# Patient Record
Sex: MALE | Race: WHITE | Employment: OTHER | URBAN - METROPOLITAN AREA
[De-identification: names, ages, dates, MRNs, and addresses within clinical notes are randomized per-mention and may not be internally consistent; named-entity substitution may affect disease eponyms.]

---

## 2017-02-01 PROBLEM — E78.00 PURE HYPERCHOLESTEROLEMIA: Status: ACTIVE | Noted: 2017-02-01

## 2017-02-01 PROCEDURE — 86803 HEPATITIS C AB TEST: CPT | Performed by: FAMILY MEDICINE

## 2017-02-01 PROCEDURE — 84153 ASSAY OF PSA TOTAL: CPT | Performed by: FAMILY MEDICINE

## 2017-02-01 PROCEDURE — 87389 HIV-1 AG W/HIV-1&-2 AB AG IA: CPT | Performed by: FAMILY MEDICINE

## 2017-05-10 PROBLEM — N52.1 ERECTILE DYSFUNCTION ASSOCIATED WITH TYPE 2 DIABETES MELLITUS (HCC): Status: ACTIVE | Noted: 2017-05-10

## 2017-05-10 PROBLEM — Z00.00 ROUTINE ADULT HEALTH MAINTENANCE: Status: ACTIVE | Noted: 2017-05-10

## 2017-05-10 PROBLEM — I48.91 ATRIAL FIBRILLATION, UNSPECIFIED TYPE (HCC): Status: ACTIVE | Noted: 2017-05-10

## 2017-05-10 PROBLEM — E11.69 ERECTILE DYSFUNCTION ASSOCIATED WITH TYPE 2 DIABETES MELLITUS (HCC): Status: ACTIVE | Noted: 2017-05-10

## 2017-05-10 PROCEDURE — 36415 COLL VENOUS BLD VENIPUNCTURE: CPT | Performed by: FAMILY MEDICINE

## 2017-05-10 PROCEDURE — 82043 UR ALBUMIN QUANTITATIVE: CPT | Performed by: FAMILY MEDICINE

## 2017-05-10 PROCEDURE — 82570 ASSAY OF URINE CREATININE: CPT | Performed by: FAMILY MEDICINE

## 2017-06-13 PROBLEM — I77.810 AORTIC ROOT DILATATION (HCC): Status: ACTIVE | Noted: 2017-06-13

## 2017-06-13 PROBLEM — Q24.8 CARDIAC VALVULAR MALFORMATION: Status: ACTIVE | Noted: 2017-06-13

## 2017-06-19 PROBLEM — E11.21 TYPE 2 DIABETES MELLITUS WITH DIABETIC NEPHROPATHY, WITHOUT LONG-TERM CURRENT USE OF INSULIN (HCC): Status: ACTIVE | Noted: 2017-06-19

## 2017-06-21 PROBLEM — E11.9 DIABETES MELLITUS TYPE 2 WITHOUT RETINOPATHY (HCC): Status: ACTIVE | Noted: 2017-06-21

## 2017-06-21 PROBLEM — H47.323 OPTIC NERVE DRUSEN, BILATERAL: Status: ACTIVE | Noted: 2017-06-21

## 2017-06-21 PROBLEM — Z96.1 PSEUDOPHAKIA: Status: ACTIVE | Noted: 2017-06-21

## 2018-03-06 PROCEDURE — 81001 URINALYSIS AUTO W/SCOPE: CPT | Performed by: FAMILY MEDICINE

## 2018-03-07 PROCEDURE — 84153 ASSAY OF PSA TOTAL: CPT | Performed by: FAMILY MEDICINE

## 2018-03-22 PROBLEM — E78.00 PURE HYPERCHOLESTEROLEMIA: Status: RESOLVED | Noted: 2017-02-01 | Resolved: 2018-03-22

## 2018-03-22 PROBLEM — J84.112 IPF (IDIOPATHIC PULMONARY FIBROSIS) (HCC): Status: ACTIVE | Noted: 2018-03-22

## 2018-06-06 PROBLEM — E11.69 HYPERLIPIDEMIA ASSOCIATED WITH TYPE 2 DIABETES MELLITUS (HCC): Status: ACTIVE | Noted: 2018-06-06

## 2018-06-06 PROBLEM — E78.5 HYPERLIPIDEMIA ASSOCIATED WITH TYPE 2 DIABETES MELLITUS (HCC): Status: ACTIVE | Noted: 2018-06-06

## 2018-06-06 PROBLEM — J98.4 CHRONIC LUNG DISEASE: Status: ACTIVE | Noted: 2018-06-06

## 2019-11-06 ENCOUNTER — HOSPITAL ENCOUNTER (INPATIENT)
Facility: HOSPITAL | Age: 79
LOS: 2 days | Discharge: HOME | DRG: 193 | End: 2019-11-08
Attending: EMERGENCY MEDICINE | Admitting: INTERNAL MEDICINE
Payer: MEDICARE

## 2019-11-06 ENCOUNTER — APPOINTMENT (OUTPATIENT)
Dept: CT IMAGING | Facility: HOSPITAL | Age: 79
DRG: 193 | End: 2019-11-06
Attending: EMERGENCY MEDICINE
Payer: MEDICARE

## 2019-11-06 ENCOUNTER — APPOINTMENT (OUTPATIENT)
Dept: GENERAL RADIOLOGY | Facility: HOSPITAL | Age: 79
DRG: 193 | End: 2019-11-06
Attending: EMERGENCY MEDICINE
Payer: MEDICARE

## 2019-11-06 DIAGNOSIS — R09.02 HYPOXIA: Primary | ICD-10-CM

## 2019-11-06 PROCEDURE — 93010 ELECTROCARDIOGRAM REPORT: CPT

## 2019-11-06 PROCEDURE — 87449 NOS EACH ORGANISM AG IA: CPT | Performed by: INTERNAL MEDICINE

## 2019-11-06 PROCEDURE — 86140 C-REACTIVE PROTEIN: CPT | Performed by: INTERNAL MEDICINE

## 2019-11-06 PROCEDURE — 93005 ELECTROCARDIOGRAM TRACING: CPT

## 2019-11-06 PROCEDURE — 99285 EMERGENCY DEPT VISIT HI MDM: CPT

## 2019-11-06 PROCEDURE — 83876 ASSAY MYELOPEROXIDASE: CPT | Performed by: INTERNAL MEDICINE

## 2019-11-06 PROCEDURE — 85730 THROMBOPLASTIN TIME PARTIAL: CPT | Performed by: EMERGENCY MEDICINE

## 2019-11-06 PROCEDURE — 87040 BLOOD CULTURE FOR BACTERIA: CPT | Performed by: EMERGENCY MEDICINE

## 2019-11-06 PROCEDURE — 82962 GLUCOSE BLOOD TEST: CPT

## 2019-11-06 PROCEDURE — 83516 IMMUNOASSAY NONANTIBODY: CPT | Performed by: INTERNAL MEDICINE

## 2019-11-06 PROCEDURE — 83880 ASSAY OF NATRIURETIC PEPTIDE: CPT | Performed by: EMERGENCY MEDICINE

## 2019-11-06 PROCEDURE — 85652 RBC SED RATE AUTOMATED: CPT | Performed by: INTERNAL MEDICINE

## 2019-11-06 PROCEDURE — 71045 X-RAY EXAM CHEST 1 VIEW: CPT | Performed by: EMERGENCY MEDICINE

## 2019-11-06 PROCEDURE — 86255 FLUORESCENT ANTIBODY SCREEN: CPT | Performed by: INTERNAL MEDICINE

## 2019-11-06 PROCEDURE — 96375 TX/PRO/DX INJ NEW DRUG ADDON: CPT

## 2019-11-06 PROCEDURE — 96365 THER/PROPH/DIAG IV INF INIT: CPT

## 2019-11-06 PROCEDURE — 85610 PROTHROMBIN TIME: CPT | Performed by: EMERGENCY MEDICINE

## 2019-11-06 PROCEDURE — 80053 COMPREHEN METABOLIC PANEL: CPT | Performed by: EMERGENCY MEDICINE

## 2019-11-06 PROCEDURE — 71275 CT ANGIOGRAPHY CHEST: CPT | Performed by: EMERGENCY MEDICINE

## 2019-11-06 PROCEDURE — 85025 COMPLETE CBC W/AUTO DIFF WBC: CPT | Performed by: EMERGENCY MEDICINE

## 2019-11-06 PROCEDURE — 94640 AIRWAY INHALATION TREATMENT: CPT

## 2019-11-06 PROCEDURE — 84484 ASSAY OF TROPONIN QUANT: CPT | Performed by: EMERGENCY MEDICINE

## 2019-11-06 PROCEDURE — 36415 COLL VENOUS BLD VENIPUNCTURE: CPT

## 2019-11-06 PROCEDURE — 86038 ANTINUCLEAR ANTIBODIES: CPT | Performed by: INTERNAL MEDICINE

## 2019-11-06 PROCEDURE — 96372 THER/PROPH/DIAG INJ SC/IM: CPT

## 2019-11-06 RX ORDER — ATORVASTATIN CALCIUM 20 MG/1
20 TABLET, FILM COATED ORAL DAILY
Status: DISCONTINUED | OUTPATIENT
Start: 2019-11-07 | End: 2019-11-08

## 2019-11-06 RX ORDER — ACETAMINOPHEN 325 MG/1
650 TABLET ORAL EVERY 6 HOURS PRN
Status: DISCONTINUED | OUTPATIENT
Start: 2019-11-06 | End: 2019-11-08

## 2019-11-06 RX ORDER — LISINOPRIL 40 MG/1
40 TABLET ORAL DAILY
COMMUNITY
End: 2020-01-01

## 2019-11-06 RX ORDER — DEXTROSE MONOHYDRATE 25 G/50ML
50 INJECTION, SOLUTION INTRAVENOUS
Status: DISCONTINUED | OUTPATIENT
Start: 2019-11-06 | End: 2019-11-08

## 2019-11-06 RX ORDER — IPRATROPIUM BROMIDE AND ALBUTEROL SULFATE 2.5; .5 MG/3ML; MG/3ML
3 SOLUTION RESPIRATORY (INHALATION) ONCE
Status: COMPLETED | OUTPATIENT
Start: 2019-11-06 | End: 2019-11-06

## 2019-11-06 RX ORDER — ATORVASTATIN CALCIUM 20 MG/1
20 TABLET, FILM COATED ORAL NIGHTLY
Status: DISCONTINUED | OUTPATIENT
Start: 2019-11-06 | End: 2019-11-06

## 2019-11-06 RX ORDER — HYDRALAZINE HYDROCHLORIDE 20 MG/ML
10 INJECTION INTRAMUSCULAR; INTRAVENOUS EVERY 6 HOURS PRN
Status: DISCONTINUED | OUTPATIENT
Start: 2019-11-06 | End: 2019-11-08

## 2019-11-06 RX ORDER — SIMVASTATIN 40 MG
40 TABLET ORAL NIGHTLY
COMMUNITY
End: 2020-01-01

## 2019-11-06 RX ORDER — LISINOPRIL 40 MG/1
40 TABLET ORAL DAILY
Status: DISCONTINUED | OUTPATIENT
Start: 2019-11-07 | End: 2019-11-08

## 2019-11-06 RX ORDER — METHYLPREDNISOLONE SODIUM SUCCINATE 125 MG/2ML
125 INJECTION, POWDER, LYOPHILIZED, FOR SOLUTION INTRAMUSCULAR; INTRAVENOUS ONCE
Status: COMPLETED | OUTPATIENT
Start: 2019-11-06 | End: 2019-11-06

## 2019-11-06 RX ORDER — IPRATROPIUM BROMIDE AND ALBUTEROL SULFATE 2.5; .5 MG/3ML; MG/3ML
3 SOLUTION RESPIRATORY (INHALATION) EVERY 4 HOURS PRN
Status: DISCONTINUED | OUTPATIENT
Start: 2019-11-06 | End: 2019-11-08

## 2019-11-06 NOTE — ED NOTES
Patient assisted with urinal at bedside. Patient sitting on side of bed and then assisted back into position of comfort. O2 sat observed to be 93% on 3L with activity. Quickly increasing back to 96% once back in bed.

## 2019-11-06 NOTE — ED INITIAL ASSESSMENT (HPI)
Patient reports worsening sob, progressing over the last year. Reports hoarse voice for past 2 days. Admits to dry cough. Reports symptoms seem to be progressing since weather has gotten colder. Patient reports possible pulmonary fibrosis diagnosis.  Arrive

## 2019-11-06 NOTE — PLAN OF CARE
Pt denies c/o pain. A/Ox4. Breath sounds diminished and crackles in bases. 3L O2 per nasal canula. Afib. HR controlled under 100 bpm. Palpable pulses +2. BP 150s/100s. MD paged and made aware. PRN Hydralazine order per eMAR.   Bowel sounds active in four q Angina  - Evaluate fluid balance, assess for edema, trend weights  Outcome: Progressing  Goal: Absence of cardiac arrhythmias or at baseline  Description  INTERVENTIONS:  - Continuous cardiac monitoring, monitor vital signs, obtain 12 lead EKG if indicated

## 2019-11-06 NOTE — ED NOTES
Patient remains updated with plan of care. Has returned from CT. Reports feeling comfortable at rest. No distress observed. Remains on 3L nc O2. O2 sat stable at 98%.

## 2019-11-06 NOTE — H&P
[facility] Hospitalist H&P       CC: Patient presents with:  Dyspnea KARLEY SOB (respiratory)       PCP: Collette Quitter, MD    History of Present Illness:  Pt is a 78year old male with PMHx a fib, HTN, HLD, gout, pulm fibrosis, pulm HTN who presented to the Soc Hx  Social History    Tobacco Use      Smoking status: Former Smoker        Packs/day: 1.50      Smokeless tobacco: Never Used      Tobacco comment: approx 40 pack yrs, quit 1980    Alcohol use:  Yes      Alcohol/week: 0.0 standard drinks 9.0       Recent Labs   Lab 11/06/19  0929   ALT 12*   AST 26   ALB 3.3*       Recent Labs   Lab 11/06/19  0929   TROP <0.045       Additional Diagnostics:     ECG: personally reviewed  Atrial fibrillation  Right bundle branch block  Left anterior fascicul opacities with thickening of the interlobular septa either representing changes related to fibrosis or underlying pneumonia. The thickening of the fissures may represent some underlying CHF/interstitial edema.  MEDIASTINUM:  There is mild mediastinal lymph shortness of breath. FINDINGS:  Cardiomegaly with pulmonary vasculature redistribution and diffuse increased interstitial opacities. Small patches of scattered airspace opacity bilaterally.   Costophrenic angles not well delineated and not included on t midnight's based on the clinical documentation in H+P. Based on patients current state of illness, I anticipate that, after discharge, patient will require TBD.

## 2019-11-06 NOTE — ED NOTES
Patient remains updated with results and plan for admission at this time. Waiting for respiratory to administer neb treatment. No acute distress observed. Feeling better at rest. Remains on 3L nc O2.

## 2019-11-06 NOTE — ED PROVIDER NOTES
Patient Seen in: [facility] Emergency Department      History   Patient presents with:  Dyspnea KARLEY SOB (respiratory)    Stated Complaint: SOB    HPI    Radha Cueva is a pleasant 44-year-old male coming with complaints of shortness of breath.   He has bee 98 %   O2 Device Nasal cannula       Current:/82   Pulse 68   Temp 98.1 °F (36.7 °C) (Temporal)   Resp 18   Ht 193 cm (6' 4\")   Wt 102.1 kg   SpO2 96%   BMI 27.39 kg/m²         Physical Exam  Alert and oriented patient appears no distress.   HEENT ex intervals and axes as noted on EKG Report.   Rate: 79  Rhythm: Atrial Fibrillation  Reading: Bifascicular block noted                      MDM     Patient was given breathing treatment here in the emerge department antibiotics steroids patient will be admit

## 2019-11-07 PROCEDURE — 85027 COMPLETE CBC AUTOMATED: CPT | Performed by: INTERNAL MEDICINE

## 2019-11-07 PROCEDURE — 87205 SMEAR GRAM STAIN: CPT | Performed by: INTERNAL MEDICINE

## 2019-11-07 PROCEDURE — 87070 CULTURE OTHR SPECIMN AEROBIC: CPT | Performed by: INTERNAL MEDICINE

## 2019-11-07 PROCEDURE — 82962 GLUCOSE BLOOD TEST: CPT

## 2019-11-07 PROCEDURE — 80048 BASIC METABOLIC PNL TOTAL CA: CPT | Performed by: INTERNAL MEDICINE

## 2019-11-07 NOTE — PROGRESS NOTES
11/07/19 5660   Clinical Encounter Type   Routine Visit   (consult to receive communion)   Referral From Nurse   Referral To   ([facility]  for [employer] as per consult received.)   Buddhism Encounters   Spiritual Requests During Visi

## 2019-11-07 NOTE — PLAN OF CARE
Patient here with increasing shortness of breath. Lung sounds diminished with crackles at bases. Daily IV antibiotics. 3L O2 with sats of 95-97%. Accuchecks QID. Need sputum culture. Plan of care updated with patient.       Problem: Diabetes/Glucose Con oxygenation and minimize respiratory effort  - Oxygen supplementation based on oxygen saturation or ABGs  - Provide Smoking Cessation handout, if applicable  - Encourage broncho-pulmonary hygiene including cough, deep breathe, Incentive Spirometry  - Asses

## 2019-11-07 NOTE — PLAN OF CARE
Patient ambulated in hallway SBA only at 100 ft pt desats to 82% 0n RA  Recovers quickly with sitting 94%  Repeated walk 85 % RA after 100 ft with oxygen at 3L/NC able to walk 500 ft oxygen saturation remained 91%. Pt denied any SOB.    At rest in chair ab

## 2019-11-07 NOTE — PROGRESS NOTES
[facility] Hospitalist Team  Progress Note      Airam Spencer  1/70/1863    Assessment/Plan:     78year old male with PMHx a fib, HTN, HLD, gout, pulm fibrosis, pulm HTN who presented to the ED with SOB and cough.      # AHRF  # Dyspnea, Cough  - pulm consulte ALT 12*   AST 26   ALB 3.3*       Recent Labs   Lab 11/06/19  1551 11/06/19  2104 11/07/19  0713 11/07/19  1136   PGLU 271* 237* 213* 190*       Meds:   Scheduled:   • cefTRIAXone  2 g Intravenous Q24H   • lisinopril  40 mg Oral Daily   • Rivaroxaban  20

## 2019-11-08 VITALS
HEIGHT: 76 IN | SYSTOLIC BLOOD PRESSURE: 125 MMHG | TEMPERATURE: 99 F | DIASTOLIC BLOOD PRESSURE: 70 MMHG | RESPIRATION RATE: 18 BRPM | BODY MASS INDEX: 27.35 KG/M2 | OXYGEN SATURATION: 95 % | WEIGHT: 224.63 LBS | HEART RATE: 71 BPM

## 2019-11-08 PROCEDURE — 82962 GLUCOSE BLOOD TEST: CPT

## 2019-11-08 RX ORDER — CEFUROXIME AXETIL 500 MG/1
500 TABLET ORAL 2 TIMES DAILY
Qty: 10 TABLET | Refills: 0 | Status: SHIPPED | OUTPATIENT
Start: 2019-11-08 | End: 2019-11-13

## 2019-11-08 NOTE — PLAN OF CARE
O2 Walk    SPO2% ON ROOM AIR AT REST 94    SPOT% AMBULATION ON ROOM AIR 82    SPO2% AMBULATION ON O2 92% ON 3 LITERS PER MINUTE.

## 2019-11-08 NOTE — PROGRESS NOTES
Pulmonary Progress Note        NAME: Rex Michael - ROOM: 8113/8639-Q - MRN: [id number] - Age: 78year old - : 1940        Last 24hrs: No events overnight, feels better since admission, notes that he still desaturates w/ ambulation but is off O2 results for input(s): BNP in the last 72 hours.     Invalid input(s): TROPI    INR   Date/Time Value Ref Range Status   11/06/2019 09:29 AM 2.25 (H) 0.90 - 1.10 Final     TSH   Date/Time Value Ref Range Status   06/03/2019 10:31 AM 2.190 0.350 - 5.500 mIU/L

## 2019-11-08 NOTE — PROGRESS NOTES
[facility] Hospitalist Team  Progress Note      Nicolejordon Dilma  [birth year]    Assessment/Plan:     78year old male with PMHx a fib, HTN, HLD, gout, pulm fibrosis, pulm HTN who presented to the ED with SOB and cough.      # AHRF  # Dyspnea, Cough  - pulm consulte CO2 22.0 24.0   BUN 11 16   CREATSERUM 0.89 0.82   CA 9.0 9.1   * 218*       Recent Labs   Lab 11/06/19  0929   ALT 12*   AST 26   ALB 3.3*       Recent Labs   Lab 11/07/19  0713 11/07/19  1136 11/07/19  1617 11/07/19  2026 11/08/19  0717   PGLU 2

## 2019-11-08 NOTE — PROGRESS NOTES
11/08/19 1328   Clinical Encounter Type   Visited With Patient   Sacramental Encounters   Sacrament of Sick-Anointing Anointed   The patient was seen by Lang Bosworth. Received prayer, Scripture, support and Sacrament of the Sick.

## 2019-11-08 NOTE — DISCHARGE SUMMARY
General Medicine Discharge Summary     Patient ID:  Lore Boo  78year old  [birth year]    Admit date: 11/6/2019    Discharge date and time: 11/8/19    Attending Physician: Chris Lal DO Procedure(s) (LRB):  BRONCHOSCOPY (N/A)       Patient instructions:      Current Discharge Medication List    START taking these medications    Cefuroxime Axetil 500 MG Oral Tab  Take 1 tablet (500 mg total) by mouth 2 (two) times daily for 5 days.       CO

## 2019-11-08 NOTE — CM/SW NOTE
RN called re: pt needing Home O2. Order sent to [facility]. RT to bring portable to patient's room. Await confirmation from [facility] that all is good with order and will deliver concentrator once pt returns home.     Referral sent to [facility] for Home RN

## 2019-11-08 NOTE — PLAN OF CARE
Patient is alert and oriented x4. Patient asking about plan of care. Patient updated on plan of care. Denies questions or concerns at this time. Patient verbalized understanding of education. Plan is possible bronchoscopy Monday.  Frequent rounding in place Progressing  Goal: Absence of cardiac arrhythmias or at baseline  Description  INTERVENTIONS:  - Continuous cardiac monitoring, monitor vital signs, obtain 12 lead EKG if indicated  - Evaluate effectiveness of antiarrhythmic and heart rate control medicati

## 2019-11-13 ENCOUNTER — ANESTHESIA (OUTPATIENT)
Dept: ENDOSCOPY | Facility: HOSPITAL | Age: 79
End: 2019-11-13
Payer: MEDICARE

## 2019-11-13 ENCOUNTER — APPOINTMENT (OUTPATIENT)
Dept: GENERAL RADIOLOGY | Facility: HOSPITAL | Age: 79
End: 2019-11-13
Attending: INTERNAL MEDICINE
Payer: MEDICARE

## 2019-11-13 ENCOUNTER — HOSPITAL ENCOUNTER (OUTPATIENT)
Facility: HOSPITAL | Age: 79
Setting detail: HOSPITAL OUTPATIENT SURGERY
Discharge: HOME | End: 2019-11-13
Attending: INTERNAL MEDICINE | Admitting: INTERNAL MEDICINE
Payer: MEDICARE

## 2019-11-13 ENCOUNTER — ANESTHESIA EVENT (OUTPATIENT)
Dept: ENDOSCOPY | Facility: HOSPITAL | Age: 79
End: 2019-11-13
Payer: MEDICARE

## 2019-11-13 VITALS
DIASTOLIC BLOOD PRESSURE: 71 MMHG | HEART RATE: 62 BPM | TEMPERATURE: 98 F | BODY MASS INDEX: 27.4 KG/M2 | RESPIRATION RATE: 18 BRPM | WEIGHT: 225 LBS | OXYGEN SATURATION: 95 % | SYSTOLIC BLOOD PRESSURE: 134 MMHG | HEIGHT: 76 IN

## 2019-11-13 PROCEDURE — 71045 X-RAY EXAM CHEST 1 VIEW: CPT | Performed by: INTERNAL MEDICINE

## 2019-11-13 PROCEDURE — 87176 TISSUE HOMOGENIZATION CULTR: CPT | Performed by: INTERNAL MEDICINE

## 2019-11-13 PROCEDURE — 87075 CULTR BACTERIA EXCEPT BLOOD: CPT | Performed by: INTERNAL MEDICINE

## 2019-11-13 PROCEDURE — 87206 SMEAR FLUORESCENT/ACID STAI: CPT | Performed by: INTERNAL MEDICINE

## 2019-11-13 PROCEDURE — 87116 MYCOBACTERIA CULTURE: CPT | Performed by: INTERNAL MEDICINE

## 2019-11-13 PROCEDURE — 87070 CULTURE OTHR SPECIMN AEROBIC: CPT | Performed by: INTERNAL MEDICINE

## 2019-11-13 PROCEDURE — 88104 CYTOPATH FL NONGYN SMEARS: CPT | Performed by: INTERNAL MEDICINE

## 2019-11-13 PROCEDURE — 89050 BODY FLUID CELL COUNT: CPT | Performed by: INTERNAL MEDICINE

## 2019-11-13 PROCEDURE — 87205 SMEAR GRAM STAIN: CPT | Performed by: INTERNAL MEDICINE

## 2019-11-13 PROCEDURE — 89051 BODY FLUID CELL COUNT: CPT | Performed by: INTERNAL MEDICINE

## 2019-11-13 PROCEDURE — 0B9H8ZX DRAINAGE OF LUNG LINGULA, VIA NATURAL OR ARTIFICIAL OPENING ENDOSCOPIC, DIAGNOSTIC: ICD-10-PCS | Performed by: INTERNAL MEDICINE

## 2019-11-13 PROCEDURE — 87071 CULTURE AEROBIC QUANT OTHER: CPT | Performed by: INTERNAL MEDICINE

## 2019-11-13 PROCEDURE — 88312 SPECIAL STAINS GROUP 1: CPT | Performed by: INTERNAL MEDICINE

## 2019-11-13 PROCEDURE — 87102 FUNGUS ISOLATION CULTURE: CPT | Performed by: INTERNAL MEDICINE

## 2019-11-13 PROCEDURE — 87077 CULTURE AEROBIC IDENTIFY: CPT | Performed by: INTERNAL MEDICINE

## 2019-11-13 PROCEDURE — 88305 TISSUE EXAM BY PATHOLOGIST: CPT | Performed by: INTERNAL MEDICINE

## 2019-11-13 PROCEDURE — 82962 GLUCOSE BLOOD TEST: CPT

## 2019-11-13 PROCEDURE — 0B9F8ZX DRAINAGE OF RIGHT LOWER LUNG LOBE, VIA NATURAL OR ARTIFICIAL OPENING ENDOSCOPIC, DIAGNOSTIC: ICD-10-PCS | Performed by: INTERNAL MEDICINE

## 2019-11-13 RX ORDER — SODIUM CHLORIDE, SODIUM LACTATE, POTASSIUM CHLORIDE, CALCIUM CHLORIDE 600; 310; 30; 20 MG/100ML; MG/100ML; MG/100ML; MG/100ML
INJECTION, SOLUTION INTRAVENOUS CONTINUOUS
Status: DISCONTINUED | OUTPATIENT
Start: 2019-11-13 | End: 2019-11-13

## 2019-11-13 RX ORDER — MIDAZOLAM HYDROCHLORIDE 1 MG/ML
INJECTION INTRAMUSCULAR; INTRAVENOUS AS NEEDED
Status: DISCONTINUED | OUTPATIENT
Start: 2019-11-13 | End: 2019-11-13 | Stop reason: SURG

## 2019-11-13 RX ORDER — DEXTROSE MONOHYDRATE 25 G/50ML
50 INJECTION, SOLUTION INTRAVENOUS
Status: DISCONTINUED | OUTPATIENT
Start: 2019-11-13 | End: 2019-11-13

## 2019-11-13 RX ORDER — NALOXONE HYDROCHLORIDE 0.4 MG/ML
80 INJECTION, SOLUTION INTRAMUSCULAR; INTRAVENOUS; SUBCUTANEOUS AS NEEDED
Status: DISCONTINUED | OUTPATIENT
Start: 2019-11-13 | End: 2019-11-13

## 2019-11-13 RX ORDER — LIDOCAINE HYDROCHLORIDE 20 MG/ML
4 INJECTION, SOLUTION INFILTRATION; PERINEURAL ONCE
Status: COMPLETED | OUTPATIENT
Start: 2019-11-13 | End: 2019-11-13

## 2019-11-13 RX ORDER — ONDANSETRON 2 MG/ML
4 INJECTION INTRAMUSCULAR; INTRAVENOUS ONCE AS NEEDED
Status: DISCONTINUED | OUTPATIENT
Start: 2019-11-13 | End: 2019-11-13

## 2019-11-13 RX ADMIN — MIDAZOLAM HYDROCHLORIDE 2 MG: 1 INJECTION INTRAMUSCULAR; INTRAVENOUS at 12:13:00

## 2019-11-13 NOTE — OPERATIVE REPORT
Bronchoscopy procedure report    Preop diagnosis: abnl CT chest  Postop diagnosis:  abnl CT chest  Procedure performed: Bronchoscopy, Diagnostic  Bronchoalveolar lavage, BAL  Transbronchial biopsy    Sedation used: MAC- please see their documentation, topi

## 2019-11-13 NOTE — ANESTHESIA POSTPROCEDURE EVALUATION
[address] Patient Status:  Hospital Outpatient Surgery   Age/Gender 78year old male MRN [id number]   Location [facility]. Attending Sanam Watkins MD   Hosp Day # 0 PCP Maximino Martin MD       Anesthesia

## 2019-11-13 NOTE — ANESTHESIA POSTPROCEDURE EVALUATION
[address] Patient Status:  Hospital Outpatient Surgery   Age/Gender 78year old male MRN [id number]   Location 118 [facility]. Attending Hilario Aguilar MD   Hosp Day # 0 PCP Veronika Campa MD       Anesthesia

## 2019-11-13 NOTE — ANESTHESIA PREPROCEDURE EVALUATION
PRE-OP EVALUATION    Patient Name: Tod Abler    Pre-op Diagnosis: PULMONARY FIBROSIS    Procedure(s):  BRONCHOSCOPY WITH BRONCHOALVEOLAR LAVAGE AND TRANSBRONCHIAL BIOPSY    Surgeon(s) and Role:     * Romie Uriarte MD - Primary    Pre-op vital Complications           GI/Hepatic/Renal                                 Cardiovascular                  (+) hypertension   (+) hyperlipidemia                                  Endo/Other      (+) diabetes                            Pulmonary

## 2019-11-13 NOTE — H&P
Consult note from 11/6 and f/u note from 11/8 reviewed. No changes in symptoms since that time  Pt continues to feel better with the O2, denies issues w/ it. No new symptoms to report.     /77 (BP Location: Left arm)   Pulse 76   Temp 98.1 °F (36.7

## 2020-01-01 ENCOUNTER — APPOINTMENT (OUTPATIENT)
Dept: CT IMAGING | Facility: HOSPITAL | Age: 80
End: 2020-01-01
Attending: EMERGENCY MEDICINE
Payer: MEDICARE

## 2020-01-01 ENCOUNTER — APPOINTMENT (OUTPATIENT)
Dept: GENERAL RADIOLOGY | Facility: HOSPITAL | Age: 80
End: 2020-01-01
Attending: EMERGENCY MEDICINE
Payer: MEDICARE

## 2020-01-01 ENCOUNTER — HOSPITAL ENCOUNTER (OUTPATIENT)
Facility: HOSPITAL | Age: 80
Setting detail: OBSERVATION
Discharge: HOME | End: 2020-01-01
Attending: EMERGENCY MEDICINE | Admitting: INTERNAL MEDICINE
Payer: MEDICARE

## 2020-01-01 VITALS
TEMPERATURE: 98 F | HEIGHT: 77 IN | WEIGHT: 212 LBS | SYSTOLIC BLOOD PRESSURE: 115 MMHG | DIASTOLIC BLOOD PRESSURE: 60 MMHG | RESPIRATION RATE: 18 BRPM | OXYGEN SATURATION: 95 % | BODY MASS INDEX: 25.03 KG/M2 | HEART RATE: 60 BPM

## 2020-01-01 DIAGNOSIS — J84.10 FIBROTIC LUNG DISEASES (HCC): ICD-10-CM

## 2020-01-01 DIAGNOSIS — R06.00 DYSPNEA, UNSPECIFIED TYPE: Primary | ICD-10-CM

## 2020-01-01 PROCEDURE — 93010 ELECTROCARDIOGRAM REPORT: CPT

## 2020-01-01 PROCEDURE — 97162 PT EVAL MOD COMPLEX 30 MIN: CPT

## 2020-01-01 PROCEDURE — 36415 COLL VENOUS BLD VENIPUNCTURE: CPT

## 2020-01-01 PROCEDURE — 83880 ASSAY OF NATRIURETIC PEPTIDE: CPT | Performed by: INTERNAL MEDICINE

## 2020-01-01 PROCEDURE — 85025 COMPLETE CBC W/AUTO DIFF WBC: CPT

## 2020-01-01 PROCEDURE — 71045 X-RAY EXAM CHEST 1 VIEW: CPT | Performed by: EMERGENCY MEDICINE

## 2020-01-01 PROCEDURE — 93005 ELECTROCARDIOGRAM TRACING: CPT

## 2020-01-01 PROCEDURE — 80048 BASIC METABOLIC PNL TOTAL CA: CPT | Performed by: INTERNAL MEDICINE

## 2020-01-01 PROCEDURE — 80053 COMPREHEN METABOLIC PANEL: CPT | Performed by: EMERGENCY MEDICINE

## 2020-01-01 PROCEDURE — 97165 OT EVAL LOW COMPLEX 30 MIN: CPT

## 2020-01-01 PROCEDURE — 96374 THER/PROPH/DIAG INJ IV PUSH: CPT

## 2020-01-01 PROCEDURE — 82962 GLUCOSE BLOOD TEST: CPT

## 2020-01-01 PROCEDURE — 70450 CT HEAD/BRAIN W/O DYE: CPT | Performed by: EMERGENCY MEDICINE

## 2020-01-01 PROCEDURE — 99285 EMERGENCY DEPT VISIT HI MDM: CPT

## 2020-01-01 PROCEDURE — 85025 COMPLETE CBC W/AUTO DIFF WBC: CPT | Performed by: EMERGENCY MEDICINE

## 2020-01-01 PROCEDURE — 85025 COMPLETE CBC W/AUTO DIFF WBC: CPT | Performed by: INTERNAL MEDICINE

## 2020-01-01 PROCEDURE — 84484 ASSAY OF TROPONIN QUANT: CPT | Performed by: EMERGENCY MEDICINE

## 2020-01-01 PROCEDURE — 83735 ASSAY OF MAGNESIUM: CPT | Performed by: INTERNAL MEDICINE

## 2020-01-01 PROCEDURE — 97535 SELF CARE MNGMENT TRAINING: CPT

## 2020-01-01 PROCEDURE — 84145 PROCALCITONIN (PCT): CPT | Performed by: INTERNAL MEDICINE

## 2020-01-01 PROCEDURE — 97116 GAIT TRAINING THERAPY: CPT

## 2020-01-01 RX ORDER — COLCHICINE 0.6 MG/1
0.6 TABLET ORAL
Status: DISCONTINUED | OUTPATIENT
Start: 2020-01-01 | End: 2020-01-01

## 2020-01-01 RX ORDER — LISINOPRIL 40 MG/1
40 TABLET ORAL DAILY
Status: DISCONTINUED | OUTPATIENT
Start: 2020-01-01 | End: 2020-01-01

## 2020-01-01 RX ORDER — ACETAMINOPHEN 325 MG/1
650 TABLET ORAL EVERY 6 HOURS PRN
Status: DISCONTINUED | OUTPATIENT
Start: 2020-01-01 | End: 2020-01-01

## 2020-01-01 RX ORDER — FUROSEMIDE 10 MG/ML
20 INJECTION INTRAMUSCULAR; INTRAVENOUS ONCE
Status: COMPLETED | OUTPATIENT
Start: 2020-01-01 | End: 2020-01-01

## 2020-01-01 RX ORDER — ATORVASTATIN CALCIUM 20 MG/1
20 TABLET, FILM COATED ORAL NIGHTLY
Refills: 4 | Status: DISCONTINUED | OUTPATIENT
Start: 2020-01-01 | End: 2020-01-01

## 2020-01-01 RX ORDER — DEXTROSE MONOHYDRATE 25 G/50ML
50 INJECTION, SOLUTION INTRAVENOUS
Status: DISCONTINUED | OUTPATIENT
Start: 2020-01-01 | End: 2020-01-01

## 2020-01-01 RX ORDER — A/SINGAPORE/GP1908/2015 IVR-180 (AN A/MICHIGAN/45/2015 (H1N1)PDM09-LIKE VIRUS, A/HONG KONG/4801/2014, NYMC X-263B (H3N2) (AN A/HONG KONG/4801/2014-LIKE VIRUS), AND B/BRISBANE/60/2008, WILD TYPE (A B/BRISBANE/60/2008-LIKE VIRUS) 15; 15; 15 UG/.5ML; UG/.5ML; UG/.5ML
INJECTION, SUSPENSION INTRAMUSCULAR ONCE
COMMUNITY
Start: 2020-01-01

## 2020-11-09 PROBLEM — R06.00 DYSPNEA: Status: ACTIVE | Noted: 2020-01-01

## 2020-11-09 PROBLEM — J84.10 FIBROTIC LUNG DISEASES (HCC): Status: ACTIVE | Noted: 2020-01-01

## 2020-11-09 PROBLEM — R06.00 DYSPNEA, UNSPECIFIED TYPE: Status: ACTIVE | Noted: 2020-01-01

## 2020-11-09 NOTE — PROGRESS NOTES
Received pt from ER via transport  Pt A&Ox4 calm and cooperative  Vs wnl, 4L O2 via nasal cannula, afib on tele  Per Dr. Rosie Bergeron d/c'd tele  [facility] consulted to see pt tomorrow per MSW  20mg IVP lasix x1 given  accucheck QID with novolog sliding

## 2020-11-09 NOTE — CONSULTS
[facility]  Report of Consultation    Berto Stoutkarla Patient Status:  Observation    1940 MRN [id number]   [facility] 3NE-A Attending Celeste Ortega MD   Hosp Day # 0 PCP Kendrick Torres MD     Reason for Consultation (Other) Brother    • Stroke Father    • Lung Disorder Father         SMOKER   • Gastro-Intestinal Disorder Brother         LACTOSE INTOLERANCE   • Heart Disorder Brother         ARRHYTHMIAS      reports that he quit smoking about 40 years ago.  His smoking carotids  Cardiac: Regular rate and irreg rhythm, S1, S2 normal, no murmur, rub or gallop. Lungs: diffuse dry rales without wheezes  Abdomen: Soft, thin, non-tender, non-distended.   No hepatosplenomegaly, bruits, masses or pulsatile masses  Extremities: W

## 2020-11-09 NOTE — ED PROVIDER NOTES
Patient Seen in: [facility] Emergency Department      History   Patient presents with:  Difficulty Breathing    Stated Complaint:     HPI    40-year-old male with history of pulmonary fibrosis presents reporting significantly worsening shortness of Monthly or less      Drinks per session: 1 or 2      Binge frequency: Never      Comment: RARE, 1-2/YR. Drug use: No      Comment: NEVER             Review of Systems    Positive for stated complaint:   Other systems are as noted in HPI.   Constitutional CBC WITH DIFFERENTIAL WITH PLATELET    Narrative: The following orders were created for panel order CBC WITH DIFFERENTIAL WITH PLATELET.   Procedure                               Abnormality         Status                     --------- Xr Chest Ap Portable  (cpt=71045)    Result Date: 11/9/2020  PROCEDURE:  XR CHEST AP PORTABLE  (CPT=71045)  TECHNIQUE:  AP chest radiograph was obtained.   COMPARISON:  [facility] , CT, CT ANGIOGRAPHY, CHEST (CPT=71275), 11/06/2019, 11:03 AM.  [facility] , XR,

## 2020-11-09 NOTE — HOSPICE RN NOTE
Spoke with patient's laura [place] to discuss Hospice services. [place] states he is going to call his father later tonight and discuss where his father would like to live post hospital discharge- to return to the facility or move in with family.  [place] states he has a

## 2020-11-09 NOTE — ED INITIAL ASSESSMENT (HPI)
Shortness of breath since  Few days getting worse now  With dry cough. Denies chest pain .  Patient is on oxygen 4 liter at home

## 2020-11-09 NOTE — CM/SW NOTE
4:32pm   2nd page to Hospice.  Edgar Kruse from [facility] called back and has referral.    3:59pm  MSW paged [facility] with referral.  Liaison will meet with the patient/familyto provide choice, explanation of services, and financial disclosure.     [facility]

## 2020-11-09 NOTE — CONSULTS
Pulmonary H&P/Consult       NAME: aFviola Lang - ROOM: [address] [place], [place] [id number] - MRN: [id number] - Age: [de-identified]year old - :  1940    Date of Admission: 2020 10:05 AM  Admission Diagnosis: Fib[facility] lung [facility] ([facility] [J84.10]  Dyspnea, unspecified type [R06 SIMVASTATIN 40 MG Oral Tab, TAKE 1 TABLET NIGHTLY, Disp: 90 tablet, Rfl: 4, 11/8/2020 at 2100    •  LISINOPRIL 40 MG Oral Tab, TAKE 1 TABLET NIGHTLY, Disp: 90 tablet, Rfl: 4, 11/9/2020 at 0900    •  FLUAD QUADRIVALENT 0.5 ML Intramuscular Prefilled Syringe service: Not on file        Active member of club or organization: Not on file        Attends meetings of clubs or organizations: Not on file        Relationship status: Not on file      Intimate partner violence        Fear of current or ex partner: Not o GENERAL:  feels well otherwise   SKIN:  denies any unusual skin lesions   EYES:  denies blurred vision or double vision   HEENT:  denies nasal congestion, sinus pain/tenderness  RESPIRATORY: see above  CARDIOVASCULAR:  denies current chest pain   GI:  de all four quadrants,     no masses, no organomegaly   Extremities:   Trace to 1+ edema L > R   Pulses:   2+ and symmetric all extremities   Skin:   Skin color, texture, turgor normal, no rashes or lesions   Neurologic:   CNII-XII intact.  Normal strength, se

## 2020-11-09 NOTE — PROGRESS NOTES
11/09/20 1645   Clinical Encounter Type   Visited With Patient   Routine Visit Introduction  (POLST requested and completed by the , waiting for MD signature)   Continue Visiting   (RN will page 2000 when the POLST form has been signed)   Sundar

## 2020-11-09 NOTE — H&P
General Medicine H&P     Patient presents with:  Difficulty Breathing       PCP: Ana Laura Brock MD    History of Present Illness: Patient is a [de-identified]year old male with PMH including but not limited to DM, HTN, HL, JASMINE, IPF who p/t [facility] ED c worsening dys Alcohol use: Not Currently      Alcohol/week: 0.0 standard drinks      Frequency: Monthly or less      Drinks per session: 1 or 2      Binge frequency: Never      Comment: RARE, 1-2/YR.        Fam Hx  Family History   Problem Relation Age of Onset   • Heart Technologist)  Patient is here with blunt head trauma. FINDINGS:  VENTRICLES/SULCI:  Mild moderate atrophy. INTRACRANIAL:  There is no hemorrhage. There is no acute territorial infarction. The basal cisterns are patent. No midline shift.   The basal c chronic respiratory failure   -2/2 progressive PF likely, HF may contribute as well   -pulm following, apprec  -supp o2 as needed, on 4L at baseline     # IPF  -per pulm  -check PCT for any possible infectious process  -CXR reviewed, opacities noted; if wo

## 2020-11-10 NOTE — HOSPICE RN NOTE
Met with pt to provide hospice info on philosophy and benefit. Pt desires to move forward with hospice upon discharge back to his [address]   Consents were signed and arrangements are being made to proceed with admission to [facility]

## 2020-11-10 NOTE — CM/SW NOTE
9:26am  MSW spoke to Darrius Avalos from hospice, hospice notes state Pt's son was called but no note that Pt was seen. Darrius Avalos states she can f/u this afternoon. MSW will f/u with pt and son about dc locations ( [facility] or pt's family's home).

## 2020-11-10 NOTE — PROGRESS NOTES
NURSING DISCHARGE NOTE    Discharged Home via Wheelchair. Accompanied by Support staff  Belongings Taken by patient/family. DC education/ instructions provided to pt and son. All questions answered. Both verbalized understanding.   Pt met with res

## 2020-11-10 NOTE — PHYSICAL THERAPY NOTE
PHYSICAL THERAPY QUICK EVALUATION - INPATIENT    Room Number: 5837/0084-Q  Evaluation Date: 11/10/2020  Presenting Problem: Pulmonary fibrosis and dyspnea, s/p fall  Physician Order: PT Eval and Treat     RAPID-SARS (-) 11/9/20    Problem List  Principal NEUROLOGICAL FINDINGS  Neurological Findings: Sensation           Sensation: BLE symmetrical/intact to light touch       ACTIVITY TOLERANCE                         O2 WALK        SPO2 Ambulation on Oxygen: 80  Ambulation oxygen flow (liters per minute) goggles    Patient End of Session: In bed;Needs met;Call light within reach;RN aware of session/findings; All patient questions and concerns addressed; Alarm set    ASSESSMENT   Patient is a [de-identified]year old male admitted on 11/9/2020 for Pulmonary fibrosis and d

## 2020-11-10 NOTE — PROGRESS NOTES
[facility] Hospitalist Progress Note     PCP: Maribeth Townsend MD    Chief Complaint: follow-up    Overnight/Interim Events:      SUBJECTIVE:  Pt feels the same, breathing a little better. On 5L.      OBJECTIVE:  Temp:  [97.8 °F (36.6 °C)-98.3 °F (36.8 °C)] -pulm following, apprec  -supp o2 as needed, on 4L at baseline      # IPF  -per pulm  -PCT neg  -CXR reviewed, opacities noted; if worsens can consider CT but hold repeat imaging for now  -pt did see Dr. Tiki Quinn 9/14 but per note, pt declined all treatm

## 2020-11-10 NOTE — PLAN OF CARE
A/Ox4. Pleasant and cooperative. On 5L O2. Hx: pulmonary fibrosis. No tele. On Xarelto for hx: afib. Abrasion to forehead s/p fall at home. Plan for [facility] with [facility].       Problem: Diabetes/Glucose Control  Goal: Glucose maintaine Department for coordinating discharge planning if the patient needs post-hospital services based on physician/LIP order or complex needs related to functional status, cognitive ability or social support system  Outcome: Progressing     Problem: RESPIRATORY

## 2020-11-10 NOTE — PLAN OF CARE
Assumed patient care @3604. Patient a&ox4. On 4-5L O2 via NC, . Will desat 60s-70s when ambulating. Dyspnea on exertion and at rest.  No tele. Restart xarelto for a.fib in AM.  Voids. No c/o pain. SBA.   Fall precautions in place- bed alarm on, roxana Provide Smoking Cessation handout, if applicable  - Encourage broncho-pulmonary hygiene including cough, deep breathe, Incentive Spirometry  - Assess the need for suctioning and perform as needed  - Assess and instruct to report SOB or any respiratory diff

## 2022-12-08 NOTE — HOME CARE LIAISON
Received referral from [facility]. Met with patient at the bedside to discuss [facility] [address]. Patient declining home health at this time.  Pt stated that he does not believe he would benefit from home health and does not plan on being homebo [Home] : at home, [unfilled] , at the time of the visit. [Medical Office: ([facility])___] : at the medical office located in  [Other:____] : [unfilled] [Follow-Up Visit] : a follow-up [FreeTextEntry3] : daughter [FreeTextEntry2] : Stage IV ampullary carcinoma

## 2023-01-28 NOTE — PROGRESS NOTES
Pulmonary Progress Note     Assessment / Plan:  1. Acute hypoxic resp failure  - due to PNA on top of pulm fibrosis and pulm HTN  - wean as sats allow  - may need to go home w/ O2  2. PNA  - treat for CAP  - monitor cultures, adjust accordingly  3.  Pulm fi 4 = No assist / stand by assistance

## (undated) DEVICE — MEDI-VAC NON-CONDUCTIVE SUCTION TUBING: Brand: CARDINAL HEALTH

## (undated) DEVICE — 1200CC GUARDIAN II: Brand: GUARDIAN

## (undated) DEVICE — AIRLIFE&#8482 MISTY MAX 10 NEBULIZER W 7 (2.1 M) CRUSH RESISTANT OXYGEN TUBING BAFFLED TEE ADAPTER, MOUTHPIECE: Brand: AIRLIFE

## (undated) DEVICE — SINGLE USE BIOPSY VALVE MAJ-210: Brand: SINGLE USE BIOPSY VALVE (STERILE)

## (undated) DEVICE — MEDI-VAC SUCTION HANDLE REGULAR CAPACITY: Brand: CARDINAL HEALTH

## (undated) DEVICE — 60 ML SYRINGE REGULAR TIP: Brand: MONOJECT

## (undated) DEVICE — SYRINGE 10ML SLIP TIP

## (undated) DEVICE — MASK ISOLATION

## (undated) DEVICE — SINGLE USE SUCTION VALVE MAJ-209: Brand: SINGLE USE SUCTION VALVE (STERILE)

## (undated) DEVICE — FILTERLINE NASAL ADULT O2/CO2

## (undated) DEVICE — 3M™ RED DOT™ MONITORING ELECTRODE WITH FOAM TAPE AND STICKY GEL, 50/BAG, 20/CASE, 72/PLT 2570: Brand: RED DOT™

## (undated) DEVICE — SPECIMEN TRAP LUKI

## (undated) DEVICE — ENDOSCOPY CHANNEL ADAPTER: Brand: ERBE

## (undated) DEVICE — FORCEPS BX 100CM 1.8MM RJ STD

## (undated) DEVICE — BOWLS UTILITY 16OZ